# Patient Record
(demographics unavailable — no encounter records)

---

## 2024-11-05 NOTE — IMAGING
[Left] : left shoulder [All Views] : anteroposterior, lateral, skyline, and anteroposterior standing [There are no fractures, subluxations or dislocations. No significant abnormalities are seen] : There are no fractures, subluxations or dislocations. No significant abnormalities are seen [Degenerative change] : Degenerative change [Right] : right knee

## 2024-11-07 NOTE — HISTORY OF PRESENT ILLNESS
[de-identified] : 11/5/24: Pt here to f/u L shoulder and L knee. Reports R Knee pain that started 10/22/24. States insidious onset. Points to pain posterior knee 'feels like a muscle pull." Right knee pain has been altering his walking gait and has been recently using a cane for assistance. Also describes pain over the distal posterior thigh and proximal posterior calf.  Denies n/t. Denies back pain.   8/8/2024: Here for f/u L shoulder and L knee. Pt states his cellulitis has mostly resolved since last visit. Still c/o pain in the L shoulder and would like to move forward with the CSI, Pt did f/u w/ cardio who cleared him.   7/9/24: Pt here to f/u L shoulder and L knee. States L shoulder is slightly improving. Attending PT 2x/week.  States L knee pain persists, taking antibiotic, has 2 days left. Denies f/c/s. Pain most present when transferring out of a chair.   7/3/24: Reports left shoulder pain was improving after CSI and with PT until sustaining fall on 06/28. Worsening left shoulder pain and newly onset left knee pain after sustaining fall. Increased pain levels with movement. Tylenol and Ice with min temporary pain relief.   02/22/2024 Mr. CARA BORJA, a 70 year old male, presents today for left shoulder pain. Reports his car broke down about two weeks ago, when trying to get into tow truck he had to lift his body causing left shoulder pain. Has been unable to lift arm or carry heavy items without pain. No prior treatment. Applied lidocaine patches and took Tylenol with no relief.

## 2024-11-07 NOTE — PROCEDURE
[FreeTextEntry3] : Large joint injection was performed of the left knee. An injection of Lidocaine 3cc of 1% , Bupivacaine (Marcaine) 6cc of 0.5% , Triamcinolone (Kenalog) 2cc of 40 mg  was used.  Patient was advised to call if redness, pain or fever occur and apply ice for 15 minutes out of every hour for the next 12-24 hours as tolerated.   Patient has tried OTC's including aspirin, Ibuprofen, Aleve, etc or prescription NSAIDS, and/or exercises at home and/or physical therapy without satisfactory response, patient had decreased mobility in the joint and the risks benefits, and alternatives have been discussed, and verbal consent was obtained.  The site was prepped with alcohol, betadine and ethyl chloride sprayed topically  The risks, benefits and contents of the injection have been discussed.  Risks include but are not limited to allergic reaction, flare reaction, permanent white skin discoloration at the injection site and infection.  The patient understands the risks and agrees to having the injection.  All questions have been answered.

## 2024-11-07 NOTE — HISTORY OF PRESENT ILLNESS
[de-identified] : 11/5/24: Pt here to f/u L shoulder and L knee. Reports R Knee pain that started 10/22/24. States insidious onset. Points to pain posterior knee 'feels like a muscle pull." Right knee pain has been altering his walking gait and has been recently using a cane for assistance. Also describes pain over the distal posterior thigh and proximal posterior calf.  Denies n/t. Denies back pain.   8/8/2024: Here for f/u L shoulder and L knee. Pt states his cellulitis has mostly resolved since last visit. Still c/o pain in the L shoulder and would like to move forward with the CSI, Pt did f/u w/ cardio who cleared him.   7/9/24: Pt here to f/u L shoulder and L knee. States L shoulder is slightly improving. Attending PT 2x/week.  States L knee pain persists, taking antibiotic, has 2 days left. Denies f/c/s. Pain most present when transferring out of a chair.   7/3/24: Reports left shoulder pain was improving after CSI and with PT until sustaining fall on 06/28. Worsening left shoulder pain and newly onset left knee pain after sustaining fall. Increased pain levels with movement. Tylenol and Ice with min temporary pain relief.   02/22/2024 Mr. CARA BORJA, a 70 year old male, presents today for left shoulder pain. Reports his car broke down about two weeks ago, when trying to get into tow truck he had to lift his body causing left shoulder pain. Has been unable to lift arm or carry heavy items without pain. No prior treatment. Applied lidocaine patches and took Tylenol with no relief.

## 2024-11-07 NOTE — DISCUSSION/SUMMARY
[de-identified] : 71m with right knee calf/hamstring strains Also with  s/s traumatic tear of the rotator cuff of the left shoulder. Resolved Cellulitis of the left knee  Pt has defibrillator, takes Eliquis, unable to under go MRI, unable to take nsaids. At this time would not be an ideal surgical candidate  1) start physical therapy  2) heat, cryotherapy, rest and activity modification  3) c/w tylenol prn  4) CSI left knee today - tolerated well   Entered by Janet Rosario acting as scribe. Dr. Baird- The documentation recorded by the scribe accurately reflects the service I personally performed and the decisions made by me.

## 2024-11-07 NOTE — DISCUSSION/SUMMARY
[de-identified] : 71m with right knee calf/hamstring strains Also with  s/s traumatic tear of the rotator cuff of the left shoulder. Resolved Cellulitis of the left knee  Pt has defibrillator, takes Eliquis, unable to under go MRI, unable to take nsaids. At this time would not be an ideal surgical candidate  1) start physical therapy  2) heat, cryotherapy, rest and activity modification  3) c/w tylenol prn  4) CSI left knee today - tolerated well   Entered by Janet Rosario acting as scribe. Dr. Baird- The documentation recorded by the scribe accurately reflects the service I personally performed and the decisions made by me.

## 2024-11-07 NOTE — PHYSICAL EXAM
[NL (0-180)] : full passive abduction 0-180 degrees [3 ___] : forward flexion 3[unfilled]/5 [3___] : internal rotation 3[unfilled]/5 [Left] : left knee [Right] : right knee [NL (0)] : extension 0 degrees [] : no pain with varus stress [FreeTextEntry3] : pitting edema [TWNoteComboBox7] : flexion 120 degrees